# Patient Record
Sex: MALE | Race: WHITE | HISPANIC OR LATINO
[De-identification: names, ages, dates, MRNs, and addresses within clinical notes are randomized per-mention and may not be internally consistent; named-entity substitution may affect disease eponyms.]

---

## 2021-06-03 ENCOUNTER — APPOINTMENT (OUTPATIENT)
Dept: HEART AND VASCULAR | Facility: CLINIC | Age: 51
End: 2021-06-03

## 2021-06-06 ENCOUNTER — RESULT CHARGE (OUTPATIENT)
Age: 51
End: 2021-06-06

## 2021-06-07 ENCOUNTER — NON-APPOINTMENT (OUTPATIENT)
Age: 51
End: 2021-06-07

## 2021-06-07 ENCOUNTER — APPOINTMENT (OUTPATIENT)
Dept: HEART AND VASCULAR | Facility: CLINIC | Age: 51
End: 2021-06-07
Payer: COMMERCIAL

## 2021-06-07 VITALS — HEART RATE: 88 BPM | DIASTOLIC BLOOD PRESSURE: 92 MMHG | SYSTOLIC BLOOD PRESSURE: 148 MMHG

## 2021-06-07 VITALS — BODY MASS INDEX: 34.4 KG/M2 | HEIGHT: 72 IN | HEART RATE: 102 BPM | WEIGHT: 254 LBS | OXYGEN SATURATION: 98 %

## 2021-06-07 VITALS — TEMPERATURE: 97.5 F

## 2021-06-07 DIAGNOSIS — Z00.00 ENCOUNTER FOR GENERAL ADULT MEDICAL EXAMINATION W/OUT ABNORMAL FINDINGS: ICD-10-CM

## 2021-06-07 DIAGNOSIS — F10.20 ALCOHOL DEPENDENCE, UNCOMPLICATED: ICD-10-CM

## 2021-06-07 DIAGNOSIS — R03.0 ELEVATED BLOOD-PRESSURE READING, W/OUT DIAGNOSIS OF HYPERTENSION: ICD-10-CM

## 2021-06-07 DIAGNOSIS — E78.5 HYPERLIPIDEMIA, UNSPECIFIED: ICD-10-CM

## 2021-06-07 DIAGNOSIS — E66.9 OBESITY, UNSPECIFIED: ICD-10-CM

## 2021-06-07 DIAGNOSIS — F41.9 ANXIETY DISORDER, UNSPECIFIED: ICD-10-CM

## 2021-06-07 PROCEDURE — 99072 ADDL SUPL MATRL&STAF TM PHE: CPT

## 2021-06-07 PROCEDURE — 93000 ELECTROCARDIOGRAM COMPLETE: CPT

## 2021-06-07 PROCEDURE — 99205 OFFICE O/P NEW HI 60 MIN: CPT

## 2021-06-07 RX ORDER — OMEPRAZOLE MAGNESIUM 10 MG/1
10 GRANULE, DELAYED RELEASE ORAL
Refills: 0 | Status: ACTIVE | COMMUNITY
Start: 2021-06-07

## 2021-06-07 NOTE — HISTORY OF PRESENT ILLNESS
[FreeTextEntry1] : PCP Dr. Yary Womack, [facility] [phone]\par \par 51 y.o M w/ no significant PMHx was referred for ASCVD risk assessments for CV prevention. \par \par He states one of his friends who is physically active recently diagnosed with "90% occlusion of coronary arteries" and he is worried about his CV risk, which prompted him to come to the office today. He states he gained about 20Lb during covid-19 pandemics; denies any chest pain or SOB on exertion. He walks daily, which is his only exercise; denies any exertional symptoms with walking or climbing stairs. He denies any personal hx of CAD or stroke. He denies any hx of HTN, HLD or DM; currently only takes PPI for heart burn. He had a blood test with his PCP recently. \par \par He smoked 3 pack daily for 5 years and quit 25 yrs ago. He drinks 8 - 12 ounce of hard liquor daily x 10 yrs; reports hx of insomnia and tremor when stopping alcohol; denies any hx of severe alcohol withdrawal/ DT. He reports hx of anxiety/ REGINA since his early 20s; took SSRI for two years in his 20s but didn't like it. \par \par Lifestyle History:\par Mediterranean Diet Score (9 question survey) was 7. \par (8-9: optimal, 6-7: near-optimal, 4-5: suboptimal, 0-3: markedly suboptimal)\par Exercise: Patient reports exercising at a moderate level for <30 minutes per week. \par Smoking: smoked 3 packs x 5 yrs; quit 25 yrs ago \par Stress: Patient denies any stress. \par Depression: denies \par Sleep apnea: denies \par Family Hx: denies family hx of premature CAD \par

## 2021-06-07 NOTE — REVIEW OF SYSTEMS
[Fever] : no fever [Headache] : no headache [Chills] : no chills [Feeling Fatigued] : not feeling fatigued [Blurry Vision] : no blurred vision [Seeing Double (Diplopia)] : no diplopia [Earache] : no earache [Discharge From Ears] : no discharge from the ears [Hearing Loss] : no hearing loss [Sore Throat] : no sore throat [Sinus Pressure] : no sinus pressure [Tinnitus] : no tinnitus [SOB] : no shortness of breath [Dyspnea on exertion] : not dyspnea during exertion [Chest Discomfort] : no chest discomfort [Leg Claudication] : no intermittent leg claudication [Palpitations] : no palpitations [Orthopnea] : no orthopnea [Syncope] : no syncope [Cough] : no cough [Wheezing] : no wheezing [Abdominal Pain] : no abdominal pain [Nausea] : no nausea [Vomiting] : no vomiting [Change in Appetite] : no change in appetite [Change In The Stool] : no change in stool [Dysphagia] : no dysphagia [Constipation] : no constipation [Blood in stool] : no blood in stoo [Joint Pain] : no joint pain [Myalgia] : no myalgia [Rash] : no rash [Skin Lesions] : no skin lesions [Dizziness] : no dizziness [Tremor] : no tremor was seen [Numbness (Hypoesthesia)] : no numbness [Convulsions] : no convulsions [Tingling (Paresthesia)] : no tingling [Weakness] : no weakness [Limb Weakness (Paresis)] : no limb weakness (Paresis) [Confusion] : no confusion was observed [Memory Lapses Or Loss] : no memory lapses or loss [Under Stress] : not under stress [Suicidal] : not suicidal [Easy Bleeding] : no tendency for easy bleeding

## 2021-06-07 NOTE — PHYSICAL EXAM
[Well Developed] : well developed [Well Nourished] : well nourished [Normal Conjunctiva] : normal conjunctiva [Normal Venous Pressure] : normal venous pressure [No Carotid Bruit] : no carotid bruit [Normal S1, S2] : normal S1, S2 [No Rub] : no rub [Clear Lung Fields] : clear lung fields [Good Air Entry] : good air entry [Soft] : abdomen soft [Non Tender] : non-tender [No Masses/organomegaly] : no masses/organomegaly [Normal Gait] : normal gait [No Edema] : no edema [No Rash] : no rash [No Skin Lesions] : no skin lesions [Moves all extremities] : moves all extremities [No Focal Deficits] : no focal deficits [Alert and Oriented] : alert and oriented [de-identified] : II/VI HSM apex and LLSB

## 2021-06-07 NOTE — DISCUSSION/SUMMARY
[FreeTextEntry1] : 51 y.o M w/ no significant PMHx was referred for ASCVD risk assessments for CV prevention. \par \par 1. HLD and Primary CV Prevention \par - will get his lipid panel from PCP to estimate 10-yr ASCVD risk and will decide on need of Statin therapy \par - he follows a vegetarian diet but eats a lot of carbohydrate, advised him to cut down on carbohydrate \par - diet and exercise discussed at length \par - advised him to start doing exercise and aiming for moderate intensity exercise at least 150 mins per week \par \par 2. Elevated BP \par - BP is elevated 148/92 mmHg \par - he walks to the office today and feels that he might have white coat hypertension \par - advise him to monitor his BP at home and will notify us if BP > 130/80 mmHg \par \par 3. Frequent PVCs \par - most likely related to alcohol use \par - will get a 2D echo to r/o structural abnormalities \par - advise him to stop alcohol \par \par 4. Alcohol abuse \par - counseled on alcohol use \par \par 5. Anxiety \par - pt is interested in seeing a psychologist \par \par After fellow left the room, patient revealed that he has had a history of bulemia and is very embarrassed about revealing this. He is concerned about the negative effects of this on his health and wants to address. He also revealed that he had done cocaine in the past, but not currently. I will discuss with Dr. Jose A Kahn to determine who the best therapist or psychiatrist would be for him. We will make sure to get him connected to a specialist to assist him and discuss with Dr. Almendarez.

## 2021-07-01 ENCOUNTER — TRANSCRIPTION ENCOUNTER (OUTPATIENT)
Age: 51
End: 2021-07-01

## 2021-07-01 ENCOUNTER — APPOINTMENT (OUTPATIENT)
Dept: HEART AND VASCULAR | Facility: CLINIC | Age: 51
End: 2021-07-01
Payer: COMMERCIAL

## 2021-07-01 VITALS
SYSTOLIC BLOOD PRESSURE: 134 MMHG | DIASTOLIC BLOOD PRESSURE: 91 MMHG | HEIGHT: 72 IN | HEART RATE: 79 BPM | BODY MASS INDEX: 34.4 KG/M2 | WEIGHT: 254 LBS | TEMPERATURE: 97 F

## 2021-07-01 DIAGNOSIS — I49.3 VENTRICULAR PREMATURE DEPOLARIZATION: ICD-10-CM

## 2021-07-01 PROCEDURE — 99072 ADDL SUPL MATRL&STAF TM PHE: CPT

## 2021-07-01 PROCEDURE — 93306 TTE W/DOPPLER COMPLETE: CPT

## 2021-07-07 PROBLEM — I49.3 FREQUENT PVCS: Status: ACTIVE | Noted: 2021-06-07
